# Patient Record
Sex: MALE | Race: WHITE | NOT HISPANIC OR LATINO | Employment: UNEMPLOYED | URBAN - METROPOLITAN AREA
[De-identification: names, ages, dates, MRNs, and addresses within clinical notes are randomized per-mention and may not be internally consistent; named-entity substitution may affect disease eponyms.]

---

## 2017-01-19 PROBLEM — B18.2 HEP C W/O COMA, CHRONIC: Status: ACTIVE | Noted: 2017-01-19

## 2017-01-28 PROBLEM — R45.851 SUICIDAL IDEATION: Status: ACTIVE | Noted: 2017-01-28

## 2018-01-01 ENCOUNTER — HOSPITAL ENCOUNTER (EMERGENCY)
Facility: HOSPITAL | Age: 33
Discharge: HOME | End: 2018-04-07
Attending: EMERGENCY MEDICINE
Payer: MEDICAID

## 2018-01-01 VITALS
HEART RATE: 124 BPM | RESPIRATION RATE: 16 BRPM | BODY MASS INDEX: 28.88 KG/M2 | SYSTOLIC BLOOD PRESSURE: 117 MMHG | OXYGEN SATURATION: 99 % | TEMPERATURE: 97 F | DIASTOLIC BLOOD PRESSURE: 76 MMHG | WEIGHT: 195 LBS | HEIGHT: 69 IN

## 2018-01-01 DIAGNOSIS — K59.00 CONSTIPATION, UNSPECIFIED CONSTIPATION TYPE: Primary | ICD-10-CM

## 2018-01-01 DIAGNOSIS — K64.9 HEMORRHOIDS, UNSPECIFIED HEMORRHOID TYPE: ICD-10-CM

## 2018-01-01 DIAGNOSIS — J06.9 UPPER RESPIRATORY TRACT INFECTION, UNSPECIFIED TYPE: ICD-10-CM

## 2018-01-01 PROCEDURE — 63600175 PHARM REV CODE 636 W HCPCS: Performed by: EMERGENCY MEDICINE

## 2018-01-01 PROCEDURE — 99283 EMERGENCY DEPT VISIT LOW MDM: CPT | Mod: 25

## 2018-01-01 PROCEDURE — 25000003 PHARM REV CODE 250: Performed by: EMERGENCY MEDICINE

## 2018-01-01 PROCEDURE — 96372 THER/PROPH/DIAG INJ SC/IM: CPT

## 2018-01-01 RX ORDER — DEXAMETHASONE SODIUM PHOSPHATE 4 MG/ML
12 INJECTION, SOLUTION INTRA-ARTICULAR; INTRALESIONAL; INTRAMUSCULAR; INTRAVENOUS; SOFT TISSUE
Status: COMPLETED | OUTPATIENT
Start: 2018-01-01 | End: 2018-01-01

## 2018-01-01 RX ORDER — CETIRIZINE HYDROCHLORIDE 10 MG/1
10 TABLET ORAL
Status: COMPLETED | OUTPATIENT
Start: 2018-01-01 | End: 2018-01-01

## 2018-01-01 RX ORDER — SYRING-NEEDL,DISP,INSUL,0.3 ML 29 G X1/2"
296 SYRINGE, EMPTY DISPOSABLE MISCELLANEOUS
Status: COMPLETED | OUTPATIENT
Start: 2018-01-01 | End: 2018-01-01

## 2018-01-01 RX ORDER — PREDNISONE 50 MG/1
50 TABLET ORAL DAILY
Qty: 10 TABLET | Refills: 0 | Status: ON HOLD | OUTPATIENT
Start: 2018-01-01 | End: 2018-01-01 | Stop reason: HOSPADM

## 2018-01-01 RX ORDER — KETOROLAC TROMETHAMINE 30 MG/ML
60 INJECTION, SOLUTION INTRAMUSCULAR; INTRAVENOUS
Status: COMPLETED | OUTPATIENT
Start: 2018-01-01 | End: 2018-01-01

## 2018-01-01 RX ADMIN — CETIRIZINE HYDROCHLORIDE 10 MG: 10 TABLET, FILM COATED ORAL at 06:04

## 2018-01-01 RX ADMIN — KETOROLAC TROMETHAMINE 60 MG: 30 INJECTION, SOLUTION INTRAMUSCULAR at 06:04

## 2018-01-01 RX ADMIN — MAGNESIUM CITRATE 296 ML: 1.75 LIQUID ORAL at 06:04

## 2018-01-01 RX ADMIN — DEXAMETHASONE SODIUM PHOSPHATE 12 MG: 4 INJECTION, SOLUTION INTRAMUSCULAR; INTRAVENOUS at 06:04

## 2018-04-07 NOTE — ED PROVIDER NOTES
"[facility]                                                  Chief Complaint  32 y.o. male with Hemorrhoids (for 2 days)    History of Present Illness  Fritz Benito presents to the emergency room with complaints of constipation, URI, and hemorrhoids.  He reports he has a new MRI after being constipated and strained.  He also reports upper respiratory infection which has exacerbated his asthma.  Has been using his inhalers at home.  Patient does not appear to be any respiratory distress on arrival.  He reports that the symptoms have been progressing for several days.  He has not tried anything for his URI symptoms.      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   Review of patient's allergies indicates:  No Known Allergies     Review of Systems and Physical Exam     Review of Systems  -- Constitution - no fever, denies fatigue, no weakness, no chills  -- Eyes - no tearing or redness, no visual disturbance  -- Ear, Nose - no tinnitus or earache, reports nasal congestion and mild cough  -- Mouth,Throat - no sore throat, no toothache, normal voice, normal swallowing  -- Respiratory - reports cough and congestion, no shortness of breath, reports wheezing  -- Cardiovascular - denies chest pain, no palpitations, denies claudication  -- Gastrointestinal - denies abdominal pain, nausea, vomiting, or diarrhea  -- Genitourinary - no dysuria, no denies flank pain, no hematuria or frequency reports hemorrhoid  -- Musculoskeletal - denies back pain, negative for myalgias and arthralgias   -- Neurological - no headache, denies weakness or seizure; no LOC  -- Skin - denies pallor, rash, or changes in skin. no hives or welts noted    Vital Signs   height is 5' 9" (1.753 m) and weight is " 88.5 kg (195 lb). His oral temperature is 96.5 °F (35.8 °C). His blood pressure is 117/76 and his pulse is 124 (abnormal). His respiration is 16 and oxygen saturation is 99%.      Physical Exam  -- Nursing note and vitals reviewed  -- Constitutional: Appears well-developed and well-nourished  -- Head: Atraumatic. Normocephalic. No obvious abnormality  -- Eyes: Pupils are equal and reactive to light. Normal conjunctiva and lids  -- Nose: Nose normal in appearance, nares grossly normal. No discharge  -- Throat: Mucous membranes moist, pharynx normal, normal tonsils. No lesions   -- Ears: External ears and TM normal bilaterally. Normal hearing and no drainage  -- Neck: Normal range of motion. Neck supple. No masses, trachea midline  -- Cardiac: Normal rate, regular rhythm and normal heart sounds  -- Pulmonary: Normal respiratory effort, breath sounds clear to auscultation, very scant wheezes noted in right lung field  -- Abdominal: Soft, no tenderness. Normal bowel sounds. Normal liver edge  -- Musculoskeletal: Normal range of motion, no effusions. Joints stable   -- Neurological: No focal deficits. Showed good interaction with staff  -- Vascular: Posterior tibial, dorsalis pedis and radial pulses 2+ bilaterally    -- Lymphatics: No cervical or peripheral lymphadenopathy. No edema noted  -- Skin: Warm and dry. No evidence of rash or cellulitis  -- Psychiatric: Normal mood and affect. Bedside behavior is appropriate  --Rectal: Exam reveals external nonthrombosed hemorrhoid with no evidence of fissure or bleeding.    Emergency Room Course     Treatment and Evaluation  1.  Physical exam significant for nonthrombosed external hemorrhoid, congestion, scant wheezes  2. Decadron 12 mg IM  3.  Ketorolac 60 mg IM  4.  Zyrtec 10 mg by mouth  5.  Magnesium citrate one bottle  6.  DC home follow-up PCP    Medications Given  Medications   dexamethasone injection 12 mg (not administered)   ketorolac injection 60 mg (not  administered)   magnesium citrate solution 296 mL (not administered)   cetirizine tablet 10 mg (not administered)           Diagnosis  --Constipation  --External nonthrombosed hemorrhoid  --URI  Disposition and Plan  -- Disposition: home  -- Condition: stable  -- Follow-up: Patient to follow up with Lillie Awad NP in 1-2 days.  -- I advised the patient that we have found no life threatening condition today  -- At this time, I believe the patient is clinically stable for discharge.   -- The patient acknowledges that close follow up with a MD is required   -- Patient agrees to comply with all instruction and direction given in the ER  -- Patient counseled on strict return precautions as discussed       Helene Stinson MD  04/07/18 [id number]

## 2018-04-07 NOTE — ED TRIAGE NOTES
32 y.o. male presents to ER ED 01/ED 01B   Chief Complaint   Patient presents with    Hemorrhoids     for 2 days   . No acute distress noted.  Dr. Stinson is at the bedside. Chaperoned for rectal exam.

## 2018-04-10 PROBLEM — I46.9 CARDIAC ARREST: Status: ACTIVE | Noted: 2018-01-01

## 2018-04-10 PROBLEM — R41.82 ALTERED MENTAL STATUS: Status: ACTIVE | Noted: 2018-01-01

## 2018-04-13 PROBLEM — N17.0 ATN (ACUTE TUBULAR NECROSIS): Status: ACTIVE | Noted: 2018-01-01

## 2018-04-13 PROBLEM — I67.82 SEVERE ANOXIC-ISCHEMIC ENCEPHALOPATHY: Status: ACTIVE | Noted: 2018-01-01

## 2018-04-13 PROBLEM — G93.1 SEVERE ANOXIC-ISCHEMIC ENCEPHALOPATHY: Status: ACTIVE | Noted: 2018-01-01

## 2018-04-13 PROBLEM — K72.00 ISCHEMIC HEPATITIS: Status: ACTIVE | Noted: 2018-01-01

## 2018-04-13 PROBLEM — R57.0 CARDIOGENIC SHOCK: Status: ACTIVE | Noted: 2018-01-01

## 2018-04-13 PROBLEM — J69.0 ASPIRATION PNEUMONIA: Status: ACTIVE | Noted: 2018-01-01
